# Patient Record
Sex: FEMALE | Race: WHITE | NOT HISPANIC OR LATINO
[De-identification: names, ages, dates, MRNs, and addresses within clinical notes are randomized per-mention and may not be internally consistent; named-entity substitution may affect disease eponyms.]

---

## 2017-04-21 ENCOUNTER — APPOINTMENT (OUTPATIENT)
Dept: ORTHOPEDIC SURGERY | Facility: CLINIC | Age: 52
End: 2017-04-21

## 2017-04-21 VITALS
WEIGHT: 145 LBS | HEART RATE: 61 BPM | BODY MASS INDEX: 25.69 KG/M2 | DIASTOLIC BLOOD PRESSURE: 73 MMHG | HEIGHT: 63 IN | SYSTOLIC BLOOD PRESSURE: 102 MMHG

## 2017-04-21 DIAGNOSIS — M93.271 OSTEOCHONDRITIS DISSECANS, RIGHT ANKLE AND JOINTS OF RIGHT FOOT: ICD-10-CM

## 2017-04-21 DIAGNOSIS — M79.671 PAIN IN RIGHT FOOT: ICD-10-CM

## 2019-01-04 ENCOUNTER — APPOINTMENT (OUTPATIENT)
Dept: ORTHOPEDIC SURGERY | Facility: CLINIC | Age: 54
End: 2019-01-04
Payer: COMMERCIAL

## 2019-01-04 VITALS
DIASTOLIC BLOOD PRESSURE: 82 MMHG | HEIGHT: 63 IN | BODY MASS INDEX: 25.69 KG/M2 | SYSTOLIC BLOOD PRESSURE: 120 MMHG | HEART RATE: 71 BPM | WEIGHT: 145 LBS

## 2019-01-04 DIAGNOSIS — D36.13 BENIGN NEOPLASM OF PERIPHERAL NERVES AND AUTONOMIC NERVOUS SYSTEM OF LOWER LIMB, INCLUDING HIP: ICD-10-CM

## 2019-01-04 PROCEDURE — 99212 OFFICE O/P EST SF 10 MIN: CPT

## 2019-08-02 ENCOUNTER — APPOINTMENT (OUTPATIENT)
Dept: ORTHOPEDIC SURGERY | Facility: CLINIC | Age: 54
End: 2019-08-02

## 2020-01-08 VITALS — DIASTOLIC BLOOD PRESSURE: 60 MMHG | SYSTOLIC BLOOD PRESSURE: 132 MMHG | HEART RATE: 80 BPM

## 2020-05-28 ENCOUNTER — APPOINTMENT (OUTPATIENT)
Dept: ORTHOPEDIC SURGERY | Facility: CLINIC | Age: 55
End: 2020-05-28
Payer: COMMERCIAL

## 2020-05-28 VITALS
HEIGHT: 63 IN | WEIGHT: 150 LBS | HEART RATE: 86 BPM | DIASTOLIC BLOOD PRESSURE: 64 MMHG | SYSTOLIC BLOOD PRESSURE: 97 MMHG | TEMPERATURE: 98.1 F | BODY MASS INDEX: 26.58 KG/M2

## 2020-05-28 DIAGNOSIS — R22.32 LOCALIZED SWELLING, MASS AND LUMP, LEFT UPPER LIMB: ICD-10-CM

## 2020-05-28 PROCEDURE — 99215 OFFICE O/P EST HI 40 MIN: CPT

## 2020-05-28 PROCEDURE — 73140 X-RAY EXAM OF FINGER(S): CPT | Mod: F1

## 2020-05-28 NOTE — PHYSICAL EXAM
[Normal Finger/nose] : finger to nose coordination [Normal] : Oriented to person, place, and time, insight and judgement were intact and the affect was normal [de-identified] : Left index finger exam\par \par Skin is intact with no erythema or ecchymosis. There is a small palpable mass over the radial aspect of the DIP joint. It is mildly tender to palpation. Range of motion is fully intact to the MCP, PIP, and DIP joints with flexion and extension. There is no evidence of infection. There are no nailbed changes. Sensation is grossly intact and capillary refill is brisk. [de-identified] : RUSSELLR [de-identified] : 3 x-ray views of the left index finger were obtained. There no fractures or dislocations noted. There is no evidence of foreign body or lucency.

## 2020-05-28 NOTE — HISTORY OF PRESENT ILLNESS
[FreeTextEntry1] : 05/28/2020: The patient is a 54-year-old right-hand-dominant female who presents to the office with a small mass on the left index finger. She states that about 2 weeks ago she was cleaning out her lint chute from her dryer and felt something poke her finger. She is concerned that there is a foreign body underneath the skin as she has a small lump on the radial aspect of the DIP joint it is painful to the touch. It does not interfere with range of motion. She is not taking any medications to alleviate her pain. She does not have pain at rest. She denies any fevers or chills. She denies any erythema that surrounds the finger.

## 2020-05-28 NOTE — ASSESSMENT
[FreeTextEntry1] : 54-year-old female with a subtle swellingat the radial aspect of index finger, no evidence of foreign body on x-ray.I recommend observation, she will followup if she develops increasing pain erythema or swelling in area.

## 2020-05-28 NOTE — CONSULT LETTER
[Consult Letter:] : I had the pleasure of evaluating your patient, [unfilled]. [Please see my note below.] : Please see my note below. [Consult Closing:] : Thank you very much for allowing me to participate in the care of this patient.  If you have any questions, please do not hesitate to contact me. [Sincerely,] : Sincerely, [FreeTextEntry3] : Dr. Ellen Pantoja\par Orthopaedic Surgery\par Hand & Upper Extremity.\par

## 2020-12-29 ENCOUNTER — TRANSCRIPTION ENCOUNTER (OUTPATIENT)
Age: 55
End: 2020-12-29

## 2022-05-13 ENCOUNTER — NON-APPOINTMENT (OUTPATIENT)
Age: 57
End: 2022-05-13

## 2022-05-14 ENCOUNTER — NON-APPOINTMENT (OUTPATIENT)
Age: 57
End: 2022-05-14

## 2022-05-24 ENCOUNTER — APPOINTMENT (OUTPATIENT)
Dept: ORTHOPEDIC SURGERY | Facility: CLINIC | Age: 57
End: 2022-05-24
Payer: COMMERCIAL

## 2022-05-24 DIAGNOSIS — R22.31 LOCALIZED SWELLING, MASS AND LUMP, RIGHT UPPER LIMB: ICD-10-CM

## 2022-05-24 PROCEDURE — 99213 OFFICE O/P EST LOW 20 MIN: CPT

## 2022-05-24 NOTE — ADDENDUM
[FreeTextEntry1] : [place] assisted with documentation on 05/24/2022  acting as scribe for Dr. Ellen Pantoja.

## 2022-05-24 NOTE — HISTORY OF PRESENT ILLNESS
[FreeTextEntry1] : 05/28/2020: The patient is a 54-year-old right-hand-dominant female who presents to the office with a small mass on the left index finger. She states that about 2 weeks ago she was cleaning out her lint chute from her dryer and felt something poke her finger. She is concerned that there is a foreign body underneath the skin as she has a small lump on the radial aspect of the DIP joint it is painful to the touch. It does not interfere with range of motion. She is not taking any medications to alleviate her pain. She does not have pain at rest. She denies any fevers or chills. She denies any erythema that surrounds the finger.\par \par 05/23/2022: Patient returns to the office for evaluation of her right index finger.  She reports hitting her right index finger against a knife in November of last year suffering laceration along the dorsal aspect of the PIP joint.  She reports seeing a physician at that time where x-rays were taken revealing no acute fracture or osseous abnormality.  She was treated with local wound care for the laceration.  She reports erythema surrounding the laceration, without any course of antibiotics at that time.  She was told the finger was not infected.  She reports over the course of 2 to 3 weeks the erythema resolved.  She presents today with continued hypersensitivity along the region of the laceration with a small mass.  She reports pain with light touch or palpation as well as with excessive activity with use of her right hand.  She presents today for second opinion regarding continued care

## 2022-05-24 NOTE — PHYSICAL EXAM
[Normal] : No swelling, no edema, normal peripheral pulses and normal temperature [de-identified] : Right index finger: \par \par Skin is intact with no erythema or ecchymosis. There is a small palpable mass over the radial aspect of the PIP joint. It is mildly tender to palpation. Range of motion is fully intact to the MCP, PIP, and DIP joints with flexion and extension. There is no evidence of infection. Sensation is grossly intact and capillary refill is brisk. [de-identified] : URSSELLR [de-identified] : No x-ray imaging done today.

## 2023-05-10 ENCOUNTER — NON-APPOINTMENT (OUTPATIENT)
Age: 58
End: 2023-05-10

## 2023-05-10 DIAGNOSIS — G43.909 MIGRAINE, UNSPECIFIED, NOT INTRACTABLE, W/OUT STATUS MIGRAINOSUS: ICD-10-CM

## 2023-05-10 DIAGNOSIS — E78.00 PURE HYPERCHOLESTEROLEMIA, UNSPECIFIED: ICD-10-CM

## 2023-06-14 ENCOUNTER — APPOINTMENT (OUTPATIENT)
Dept: NEUROSURGERY | Facility: CLINIC | Age: 58
End: 2023-06-14
Payer: COMMERCIAL

## 2023-06-14 VITALS
WEIGHT: 155 LBS | HEART RATE: 54 BPM | BODY MASS INDEX: 27.46 KG/M2 | DIASTOLIC BLOOD PRESSURE: 74 MMHG | OXYGEN SATURATION: 100 % | SYSTOLIC BLOOD PRESSURE: 107 MMHG | HEIGHT: 63 IN

## 2023-06-14 DIAGNOSIS — R20.0 ANESTHESIA OF SKIN: ICD-10-CM

## 2023-06-14 DIAGNOSIS — G89.29 DORSALGIA, UNSPECIFIED: ICD-10-CM

## 2023-06-14 DIAGNOSIS — M54.9 DORSALGIA, UNSPECIFIED: ICD-10-CM

## 2023-06-14 DIAGNOSIS — R20.2 ANESTHESIA OF SKIN: ICD-10-CM

## 2023-06-14 PROCEDURE — 99205 OFFICE O/P NEW HI 60 MIN: CPT

## 2023-06-15 ENCOUNTER — NON-APPOINTMENT (OUTPATIENT)
Age: 58
End: 2023-06-15

## 2023-06-15 NOTE — CONSULT LETTER
[Dear  ___] : Dear  [unfilled], [Courtesy Letter:] : I had the pleasure of seeing your patient, [unfilled], in my office today. [Sincerely,] : Sincerely, [FreeTextEntry2] : Chip Santos MD\par [address]\[place] [address]\par  [FreeTextEntry1] : Mrs. Mcarthur is a very pleasant 57-year-old female patient who was seen in our office today in regards to low back pain radiating into the right leg with associated bilateral foot numbness.\par \par The patient endorses a remote history of a lumbar surgery performed many years ago.  At the time, the patient describes a foot drop on the right and was taken to the operating room emergently for a hemilaminectomy and presumed microdiscectomy.  The patient was told that she was "days away from cauda equina syndrome" at the time of surgery.  Ultimately, the patient was left with right-sided leg numbness and chronic back pain after her surgery which occurred intermittently.  The patient's most recent episode occurred 2 years ago where she had severe back pain but without any numbness or tingling.  This pain resolved spontaneously.  The patient's low back pain is fairly constant but worse with sitting.  Recumbency helps with her back pain as does moving while she is standing.  The patient has difficulty describing the pain but says it it is a deep pain as though "someone has kicked you".  Over the last 2 weeks, the patient has noticed increasing numbness in the interspace between the toes of the left more than the right foot.  The patient has been taking Aleve but this has not provided significant relief. The patient has difficulty with NSAID medications as she has a history of ulcers.\par \par The patient endorses only migraines as part of her medical history.  The patient's current medical regimen includes pantoprazole, zolpidem, Wegovy, and migraine medications.  The patient endorses an allergy to Zithromax which causes hives. \par \par On examination, the patient is alert, oriented, and compliant with the exam.  The patient demonstrates full strength in the lower extremities bilaterally without evidence of hyperreflexia or ankle clonus.  The patient is fairly uncomfortable at today's visit and has difficulty sitting still because of her pain.\par \par The patient is accompanied with an MRI scan of the lumbar spine dated June 6, 2023.  These images demonstrate near complete disc height loss at L4/5 with evidence of a previous hemilaminectomy.  Foraminal stenosis is noted at this level slightly worse on the left but without overt nerve root compression.  The rest of the spine demonstrates only mild degenerative changes without overt nerve root or cauda equina compression.\par \par Taken together, the patient has a clinical history and radiographic findings most consistent with mild factorial low back pain.  The patient's degenerative disc at L4/5 with near complete disc height loss is likely causing some degree of mechanical pain particularly when sitting and flexing.  I explained to the patient that these images do not demonstrate any ongoing nerve root compression but inflammatory changes secondary to her degenerative changes may be causing irritation of the nearby nerve roots.  I have offered the patient the option of an EMG/nerve conduction study given the unusual localization of her numbness which is more consistent with a deep peroneal nerve distribution.  However, the patient believes that her pain is associated more so with her back to defer this particular test for now. I explained to the patient that surgical intervention for this particular condition would entail a transforaminal lumbar interbody fusion to hopefully increase the foraminal height and stabilize this level of her spine.  However, I have also mentioned to the patient that this surgical option should very much remain a last resort.  At this time, the patient would like to attempt conservative therapies including physical therapy, massage therapy, diclofenac topical gel, and a pain management referral.  The patient will be following up with us in a few weeks to reevaluate her progress and response to therapy.\par  [FreeTextEntry3] : Nba Olguin MD, PhD, CS, FAANS Attending Neurosurgeon  of Neurosurgery [facility] [employer] [facility] at [facility] at [address], [place] [address] Office: [phone] Fax: [phone]

## 2023-06-26 ENCOUNTER — NON-APPOINTMENT (OUTPATIENT)
Age: 58
End: 2023-06-26

## 2023-07-25 ENCOUNTER — APPOINTMENT (OUTPATIENT)
Dept: NEUROSURGERY | Facility: CLINIC | Age: 58
End: 2023-07-25
Payer: COMMERCIAL

## 2023-07-25 VITALS
WEIGHT: 152 LBS | HEIGHT: 63 IN | BODY MASS INDEX: 26.93 KG/M2 | OXYGEN SATURATION: 97 % | HEART RATE: 80 BPM | SYSTOLIC BLOOD PRESSURE: 109 MMHG | DIASTOLIC BLOOD PRESSURE: 76 MMHG

## 2023-07-25 DIAGNOSIS — M54.50 LOW BACK PAIN, UNSPECIFIED: ICD-10-CM

## 2023-07-25 DIAGNOSIS — G89.29 LOW BACK PAIN, UNSPECIFIED: ICD-10-CM

## 2023-07-25 PROCEDURE — 99215 OFFICE O/P EST HI 40 MIN: CPT

## 2023-07-25 RX ORDER — CELECOXIB 200 MG/1
200 CAPSULE ORAL TWICE DAILY
Qty: 60 | Refills: 0 | Status: ACTIVE | COMMUNITY
Start: 2023-07-25 | End: 1900-01-01

## 2023-07-25 RX ORDER — METHYLPREDNISOLONE 4 MG/1
4 TABLET ORAL
Qty: 1 | Refills: 1 | Status: DISCONTINUED | COMMUNITY
Start: 2023-06-26 | End: 2023-07-25

## 2023-07-25 RX ORDER — DICLOFENAC SODIUM 1% 10 MG/G
1 GEL TOPICAL DAILY
Qty: 1 | Refills: 3 | Status: DISCONTINUED | COMMUNITY
Start: 2023-06-14 | End: 2023-07-25

## 2023-07-26 PROBLEM — M54.50 CHRONIC LOW BACK PAIN: Status: ACTIVE | Noted: 2023-06-14

## 2023-07-26 NOTE — CONSULT LETTER
[Dear  ___] : Dear  [unfilled], [Courtesy Letter:] : I had the pleasure of seeing your patient, [unfilled], in my office today. [Sincerely,] : Sincerely, [FreeTextEntry2] : Chip Santos MD\par [address]\[place] [address] [FreeTextEntry1] : Mrs. Mcarthur is a very pleasant 57-year-old female patient who was seen in our office today in follow-up to review imaging findings and discuss her response to conservative therapy.\par \par The patient has been experiencing low back pain for several years but worsened recently to include left-sided numbness particularly in the soles of her left foot as well as the dorsum of the left foot between the first and second digit.  The patient has chronic right-sided numbness as a result of a disc herniation and surgeries several years ago.  Since her last visit, the patient attempted an epidural injection which provided some relief for approximately 24 hours.  The patient believes that physical therapy is helping albeit very slowly.  The patient has not yet attempted massage therapy.  The patient endorses slight worsening of the numbness in her left leg.\par \par On examination, the patient remains alert, oriented, and compliant with the exam.  The patient continues to demonstrate full strength in the lower extremities bilaterally.  No evidence of hyperreflexia or ankle clonus is noted.  The patient ambulates well.  The patient appears slightly more comfortable than her last visit.\par \par The patient is accompanied with an MRI scan of the lumbar spine dated June 6, 2023.  These images demonstrate severe disc height loss at L4/5 with bilateral foraminal stenosis.  Despite this significant stenosis, there does not appear to be ongoing nerve root compression at the foramen.  Bilateral lateral recess stenosis is also noted at L4/5 possibly contacting the traversing left nerve root.\par \par Taken together, the patient has a clinical history and radiographic findings most consistent with multifactorial low back pain and a possible radiculopathy on the left.  The patient's radicular symptoms are currently not a big concern for the patient.  I have offered EMG/nerve conduction studies but the patient would like to defer this test for now. We spent some time talking about the role of surgical intervention in her specific case.  The patient's imaging findings demonstrate almost complete loss of disc height at the L4/5 level which may be contributing at least in part to her pain.  The successful fusion across this level may help with the patient's arthritic/bony pain but would unlikely help with the patient's muscle pain.  I counseled the patient that her symptoms are almost certainly a combination of these factors and also went over several therapeutic options.  The patient is currently being followed by a pain management physician which I think is very reasonable for the possibility of further injections and/or medical treatment.  The patient has a history of stomach ulcers and has been on PPI medications for many years.  The patient is, however, willing to try Celebrex which is a NSAID designed to have less GI adverse effects.  The patient has been instructed about the risks of using this medication and to contact our office should she develop any unusual or concerning symptoms.  At this time, the patient would like to follow-up with us on an as-needed basis which I think is very reasonable given her situation. [FreeTextEntry3] : Nba Olguin MD, PhD, FRCPSC \par Attending Neurosurgeon \par [facility] \par [address], [address] floor \[place] [address] \par Office: [phone] \par Fax: [phone]\par \par